# Patient Record
Sex: MALE | Race: WHITE
[De-identification: names, ages, dates, MRNs, and addresses within clinical notes are randomized per-mention and may not be internally consistent; named-entity substitution may affect disease eponyms.]

---

## 2019-08-09 ENCOUNTER — HOSPITAL ENCOUNTER (OUTPATIENT)
Dept: HOSPITAL 62 - OROUT | Age: 69
Discharge: HOME | End: 2019-08-09
Attending: ORTHOPAEDIC SURGERY
Payer: MEDICARE

## 2019-08-09 VITALS — SYSTOLIC BLOOD PRESSURE: 149 MMHG | DIASTOLIC BLOOD PRESSURE: 90 MMHG

## 2019-08-09 DIAGNOSIS — Z87.891: ICD-10-CM

## 2019-08-09 DIAGNOSIS — S66.821A: ICD-10-CM

## 2019-08-09 DIAGNOSIS — S61.411A: Primary | ICD-10-CM
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
## 2019-08-09 LAB
ANION GAP SERPL CALC-SCNC: 10 MMOL/L (ref 5–19)
APPEARANCE UR: CLEAR
APTT PPP: YELLOW S
BILIRUB UR QL STRIP: NEGATIVE
BUN SERPL-MCNC: 15 MG/DL (ref 7–20)
CALCIUM: 9.4 MG/DL (ref 8.4–10.2)
CHLORIDE SERPL-SCNC: 104 MMOL/L (ref 98–107)
CO2 SERPL-SCNC: 21 MMOL/L (ref 22–30)
ERYTHROCYTE [DISTWIDTH] IN BLOOD BY AUTOMATED COUNT: 13.8 % (ref 11.5–14)
GLUCOSE SERPL-MCNC: 115 MG/DL (ref 75–110)
GLUCOSE UR STRIP-MCNC: NEGATIVE MG/DL
HCT VFR BLD CALC: 46.6 % (ref 37.9–51)
HGB BLD-MCNC: 16 G/DL (ref 13.5–17)
KETONES UR STRIP-MCNC: NEGATIVE MG/DL
MCH RBC QN AUTO: 30.2 PG (ref 27–33.4)
MCHC RBC AUTO-ENTMCNC: 34.4 G/DL (ref 32–36)
MCV RBC AUTO: 88 FL (ref 80–97)
NITRITE UR QL STRIP: NEGATIVE
PH UR STRIP: 6 [PH] (ref 5–9)
PLATELET # BLD: 195 10^3/UL (ref 150–450)
POTASSIUM SERPL-SCNC: 4.9 MMOL/L (ref 3.6–5)
PROT UR STRIP-MCNC: NEGATIVE MG/DL
RBC # BLD AUTO: 5.31 10^6/UL (ref 4.35–5.55)
SP GR UR STRIP: 1.01
UROBILINOGEN UR-MCNC: NEGATIVE MG/DL (ref ?–2)
WBC # BLD AUTO: 7.6 10^3/UL (ref 4–10.5)

## 2019-08-09 PROCEDURE — 80048 BASIC METABOLIC PNL TOTAL CA: CPT

## 2019-08-09 PROCEDURE — 85027 COMPLETE CBC AUTOMATED: CPT

## 2019-08-09 PROCEDURE — 26418 REPAIR FINGER TENDON: CPT

## 2019-08-09 PROCEDURE — 71045 X-RAY EXAM CHEST 1 VIEW: CPT

## 2019-08-09 PROCEDURE — 81001 URINALYSIS AUTO W/SCOPE: CPT

## 2019-08-09 PROCEDURE — 36415 COLL VENOUS BLD VENIPUNCTURE: CPT

## 2019-08-09 PROCEDURE — 93005 ELECTROCARDIOGRAM TRACING: CPT

## 2019-08-09 PROCEDURE — 93010 ELECTROCARDIOGRAM REPORT: CPT

## 2019-08-09 NOTE — RADIOLOGY REPORT (SQ)
EXAM DESCRIPTION:  CHEST SINGLE VIEW



COMPLETED DATE/TIME:  8/9/2019 10:45 am



REASON FOR STUDY:  PRE OP



COMPARISON:  None.



EXAM PARAMETERS:  NUMBER OF VIEWS: One view.

TECHNIQUE: Single frontal radiographic view of the chest acquired.

RADIATION DOSE: NA

LIMITATIONS: None.



FINDINGS:  LUNGS AND PLEURA: Low lung volumes.  No focal consolidation, pleural effusion or pneumotho
rax.  MEDIASTINUM AND HILAR STRUCTURES: No masses.  Contour normal.

HEART AND VASCULAR STRUCTURES: Enlarged cardiac silhouette.

BONES: No acute findings.

HARDWARE: None in the chest.

OTHER: No other significant finding.



IMPRESSION:  Enlarged cardiac silhouette without other evidence of acute cardiopulmonary process.



TECHNICAL DOCUMENTATION:  JOB ID:  [id number]

 2011 [employer]- All Rights Reserved



Reading location - IP/workstation name: [email or web address]

## 2019-08-09 NOTE — OPERATIVE REPORT
Operative Report


DATE OF SURGERY: 08/09/19


PREOPERATIVE DIAGNOSIS: Right 3rd digit extensor tendon laceration Zone V


POSTOPERATIVE DIAGNOSIS: Right 3rd digit extensor tendon laceration Zone V


OPERATION: Repair Right 3rd digit extensor tendon laceration Zone V


SURGEON: SOLOMON LEVINE


ANESTHESIA: GA


COMPLICATIONS: 





None


ESTIMATED BLOOD LOSS: Minimal


PROCEDURE: 


Indication for above procedure:





69-year-old male who sustained a laceration to the dorsum of his right hand.  

After injury patient was unable to fully extend his digit.  At that point d

ecision was made to proceed with operative intervention.  Risks and benefits 

were explained patient verbalized understanding consented for surgical 

procedure.





Procedure In Detail:





Patient was seen and evaluated in the preoperative holding area.  The RIGHT 

upper extremity was initialized and marked.  Patient received 2g of Ancef IV for

bacterial prophylaxis.  Patient was taken back to the operative room where 

transferred to the operative table and placed under general anesthesia.  Once 

they were adequately anesthetized a nonsterile tourniquet was placed on the 

upper extremity.  A surgical team debriefing was performed ensuring all 

instrumentation was available, the surgical procedure was discussed with 

possible concerns reviewed.  The upper extremity was prepped with chlorhexidine 

and alcohol and draped in a sterile fashion.   A timeout was done identifying 

correct patient, procedure and extremity everyone in attendance agree with this 

and verbalized no concerns. The extremity was exsanguinated the tourniquet was 

inflated to 250 mmHg.  





Laceration site was extended proximally and distally.  Blunt dissection was 

performed.  Small dorsal veins were coagulated with bipolar cautery.  The 

extensor tendon was then identified with complete transection.  MCP joint was 

inspected and copiously irrigated with normal saline.  There is no evidence of 

gross contamination or foreign body.  Partial synovectomy of the MCP joint was 

performed.  The 2 edges of the extensor mechanism was then reapproximated with a

M-Ley interlocking stitch x2 obtaining 6 strands across the tendon site.  There

is no evidence of gapping.  With gentle passive flexion to neutral there is no 

evidence of widening and stable fixation.








Wound was then copiously irrigated with normal saline once again.  Tourniquet 

was deflated any peripheral bleeding was controlled with bipolar cautery.  Skin 

was closed with interrupted 4-0 nylon suture.  10 cc of 0.5% bupivacaine without

epinephrine was injected for postoperative pain control.  Patient was placed in 

a radial gutter splint maintaining hyperextension of the second third MCP joint 

leaving the IP joints free.





Sponge counts, instrument counts, needle counts were correct.  Patient was then 

awoken from anesthesia.  Transferred from the operating room table to the 

operating room stretcher.  There was no intraoperative complications patient 

tolerated procedure well stable to PACU.








Postoperative plan:





Patient follow-up the office in 2 weeks for wound check.  Will be set up for 

occupational therapy and fit for a thermoplastic splint as per zone V protocol.

## 2019-08-09 NOTE — EKG REPORT
SEVERITY:- BORDERLINE ECG -

SINUS RHYTHM

:

Confirmed by: Margarito Herman MD 09-Aug-2019 13:26:51

## 2021-06-03 NOTE — DISCHARGE SUMMARY
Discharge Summary (SDC)





- Discharge


Final Diagnosis: 





Right 3rd digit extensor tendon laceration Zone V


Date of Surgery: 08/09/19


Discharge Date: 08/09/19


Condition: Good


Treatment or Instructions: 


Schedule Follow Up w/ Dr. Mati Yan @ [facility] to be seen 

in 10-14 days or as scheduled





[place]: [phone] 


[place]: [phone]


[place]: [phone] 





Ice and elevate





Keep splint clean/dry/intact, do not remove until occupational therapy





Stool softener of choice when on pain medication.





USE OF OVER-THE-COUNTER IBUPROFEN:


     Ibuprofen (Advil, Nuprin, Medipren, Motrin IB) is a medication for fever 

and pain control.  In addition, it has anti- inflammatory effects which may be 

beneficial, especially in the treatment of injuries.


     It's best to take ibuprofen with food.  Persons with ulcer disease or 

allergy to aspirin should notify their physician of this before taking 

ibuprofen.


     Ibuprofen can be given every four to six hours, for a total of four doses d

aily.


     Age              Pain or fever dose          Antiinflammatory dose


     6-8 yr              200 mg (1 tab)                200 mg (1 tab)


     9-11 yr             200 mg (1 tab)                200-400 mg (1-2 tab)


     11-14 yr            200-400 mg (1-2 tab)         400 mg (2 tab)


     15-adult            400 mg (2 tab)                600 mg (3 tab)








ORAL NARCOTIC MEDICATION:


     You have been given a prescription for pain control.  This medication is a 

narcotic.  It's best taken with food, as nausea can result if taken on an empty 

stomach.


     Don't operate machinery or drive within six hours of taking this 

medication.  Do not combine this medicine with alcohol, or with any medication 

which can cause sedation (such as cold tablets or sleeping pills) unless you get

permission from the physician.


     Narcotics tend to cause constipation.  If possible, drink plenty of fluids 

and eat a diet high in fiber and fruits.





     Please be aware that prescription narcotics also have the potential for 

abuse.  People become addicted to these medications because of the general sense

of wellbeing that they induce.  This feeling along with a significant reduction 

in tension, anxiety, and aggression provides a stimulating seductive quality to 

these drugs.  Once your pain is under control, we encourage you to discard your 

unused narcotics.





Prescriptions: 


Hydrocodone/Acetaminophen [Norco 5-325 mg Tablet] 1 tab PO Q6 PRN #20 tablet


 PRN Reason: 


Discharge Diet: As Tolerated


Respiratory Treatments at Home: Deep Breathing/Coughing


Discharge Activity: No Lifting Over 10 Pounds, No Lifting/Push/Pulling


Report the Following to Your Physician Immediately: Fever over 101 Degrees, 

Unusual Bleeding, Redness, Swelling, Warmth, Increased Soreness no